# Patient Record
Sex: MALE | Race: WHITE
[De-identification: names, ages, dates, MRNs, and addresses within clinical notes are randomized per-mention and may not be internally consistent; named-entity substitution may affect disease eponyms.]

---

## 2018-03-12 ENCOUNTER — HOSPITAL ENCOUNTER (EMERGENCY)
Dept: HOSPITAL 92 - ERS | Age: 43
Discharge: HOME | End: 2018-03-12
Payer: COMMERCIAL

## 2018-03-12 DIAGNOSIS — S68.123A: Primary | ICD-10-CM
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
## 2018-03-12 PROCEDURE — 12002 RPR S/N/AX/GEN/TRNK2.6-7.5CM: CPT

## 2018-03-12 NOTE — RAD
LEFT MIDDLE FINGER 3 VIEWS:

 

Date:  03/12/18 

 

HISTORY:  

Finger injury. 

 

FINDINGS:

Soft tissue laceration of the tuft is apparent. Tiny radiodensities at the laceration may represent m
etallic foreign bodies or iodine liquid. No acute fracture or dislocation apparent. Joint spaces are 
preserved. 

 

IMPRESSION: 

Extensive soft tissue laceration tuft left middle finger. 

 

 

POS: [facility]